# Patient Record
Sex: MALE | Race: WHITE | NOT HISPANIC OR LATINO | Employment: OTHER
[De-identification: names, ages, dates, MRNs, and addresses within clinical notes are randomized per-mention and may not be internally consistent; named-entity substitution may affect disease eponyms.]

---

## 2018-05-24 ENCOUNTER — CHARTING TRANS (OUTPATIENT)
Dept: OTHER | Age: 58
End: 2018-05-24

## 2019-01-08 ENCOUNTER — TELEPHONE (OUTPATIENT)
Dept: FAMILY MEDICINE | Age: 59
End: 2019-01-08

## 2019-01-08 RX ORDER — LANSOPRAZOLE 30 MG/1
30 CAPSULE, DELAYED RELEASE ORAL DAILY
Qty: 90 CAPSULE | Refills: 1 | Status: SHIPPED | OUTPATIENT
Start: 2019-01-08

## 2019-01-08 RX ORDER — LANSOPRAZOLE 30 MG/1
1 CAPSULE, DELAYED RELEASE ORAL DAILY
COMMUNITY
End: 2019-01-08 | Stop reason: SDUPTHER

## 2019-01-29 ENCOUNTER — TELEPHONE (OUTPATIENT)
Dept: FAMILY MEDICINE | Age: 59
End: 2019-01-29

## 2019-01-29 RX ORDER — TADALAFIL 5 MG/1
5 TABLET ORAL DAILY PRN
COMMUNITY
End: 2019-01-29 | Stop reason: SDUPTHER

## 2019-01-29 RX ORDER — TADALAFIL 5 MG/1
5 TABLET ORAL DAILY PRN
Qty: 30 TABLET | Refills: 0 | Status: SHIPPED | OUTPATIENT
Start: 2019-01-29 | End: 2019-04-02 | Stop reason: SDUPTHER

## 2019-04-02 RX ORDER — TADALAFIL 5 MG/1
TABLET ORAL
Qty: 30 TABLET | Refills: 0 | Status: SHIPPED | OUTPATIENT
Start: 2019-04-02

## 2020-07-21 PROBLEM — E11.29 TYPE 2 DIABETES MELLITUS WITH KIDNEY COMPLICATION, WITHOUT LONG-TERM CURRENT USE OF INSULIN  (CMD): Status: ACTIVE | Noted: 2020-07-21

## 2020-07-21 PROBLEM — I44.2 CHB (COMPLETE HEART BLOCK) (CMD): Chronic | Status: ACTIVE | Noted: 2020-07-21

## 2020-07-21 PROBLEM — E11.49 TYPE 2 DIABETES MELLITUS WITH NEUROLOGIC COMPLICATION, WITHOUT LONG-TERM CURRENT USE OF INSULIN  (CMD): Status: ACTIVE | Noted: 2020-07-21

## 2020-07-21 PROBLEM — E78.49 OTHER HYPERLIPIDEMIA: Status: ACTIVE | Noted: 2020-07-21

## 2020-07-21 PROBLEM — E11.9 TYPE 2 DIABETES MELLITUS WITHOUT COMPLICATION, WITHOUT LONG-TERM CURRENT USE OF INSULIN  (CMD): Status: ACTIVE | Noted: 2020-07-21

## 2020-07-21 PROBLEM — B35.1 ONYCHOMYCOSIS OF TOENAIL: Status: ACTIVE | Noted: 2020-07-21

## 2020-07-21 PROBLEM — I48.0 PAROXYSMAL ATRIAL FIBRILLATION (CMD): Status: ACTIVE | Noted: 2020-07-21

## 2020-07-21 PROBLEM — E11.9 TYPE 2 DIABETES MELLITUS WITHOUT COMPLICATION, WITHOUT LONG-TERM CURRENT USE OF INSULIN (CMD): Chronic | Status: ACTIVE | Noted: 2020-07-21

## 2020-07-21 PROBLEM — I48.0 PAROXYSMAL ATRIAL FIBRILLATION  (CMD): Chronic | Status: ACTIVE | Noted: 2020-07-21

## 2021-12-16 ENCOUNTER — EXTERNAL RECORD (OUTPATIENT)
Dept: HEALTH INFORMATION MANAGEMENT | Facility: OTHER | Age: 61
End: 2021-12-16

## 2023-09-06 ENCOUNTER — HOSPITAL ENCOUNTER (OUTPATIENT)
Dept: CARDIAC REHAB | Age: 63
Discharge: STILL ADMITTED OR ADMINISTRATIVE | End: 2023-09-06

## 2023-09-06 VITALS — HEIGHT: 69 IN | BODY MASS INDEX: 24.81 KG/M2

## 2023-09-06 PROCEDURE — 93798 PHYS/QHP OP CAR RHAB W/ECG: CPT

## 2023-09-06 ASSESSMENT — PATIENT HEALTH QUESTIONNAIRE - PHQ9
7. TROUBLE CONCENTRATING ON THINGS, SUCH AS READING THE NEWSPAPER OR WATCHING TELEVISION: NOT AT ALL
SUM OF ALL RESPONSES TO PHQ QUESTIONS 1-9: 4
5. POOR APPETITE OR OVEREATING: NOT AT ALL
6. FEELING BAD ABOUT YOURSELF - OR THAT YOU ARE A FAILURE OR HAVE LET YOURSELF OR YOUR FAMILY DOWN: NOT AT ALL
10. IF YOU CHECKED OFF ANY PROBLEMS, HOW DIFFICULT HAVE THESE PROBLEMS MADE IT FOR YOU TO DO YOUR WORK, TAKE CARE OF THINGS AT HOME, OR GET ALONG WITH OTHER PEOPLE: NOT DIFFICULT AT ALL
3. TROUBLE FALLING OR STAYING ASLEEP OR SLEEPING TOO MUCH: NOT AT ALL
4. FEELING TIRED OR HAVING LITTLE ENERGY: MORE THAN HALF THE DAYS
1. LITTLE INTEREST OR PLEASURE IN DOING THINGS: NOT AT ALL
8. MOVING OR SPEAKING SO SLOWLY THAT OTHER PEOPLE COULD HAVE NOTICED. OR THE OPPOSITE, BEING SO FIGETY OR RESTLESS THAT YOU HAVE BEEN MOVING AROUND A LOT MORE THAN USUAL: MORE THAN HALF THE DAYS
9. THOUGHTS THAT YOU WOULD BE BETTER OFF DEAD, OR OF HURTING YOURSELF: NOT AT ALL
2. FEELING DOWN, DEPRESSED OR HOPELESS: NOT AT ALL

## 2023-09-06 ASSESSMENT — EJECTION FRACTION: LVEF_VALUE: 50

## 2023-09-06 ASSESSMENT — LIFESTYLE VARIABLES: ALCOHOL_INTAKE: 0

## 2023-09-07 ENCOUNTER — HOSPITAL ENCOUNTER (OUTPATIENT)
Dept: CARDIAC REHAB | Age: 63
Discharge: STILL ADMITTED OR ADMINISTRATIVE | End: 2023-09-07

## 2023-09-07 PROCEDURE — 93798 PHYS/QHP OP CAR RHAB W/ECG: CPT

## 2023-09-11 ENCOUNTER — HOSPITAL ENCOUNTER (OUTPATIENT)
Dept: CARDIAC REHAB | Age: 63
Discharge: STILL ADMITTED OR ADMINISTRATIVE | End: 2023-09-11

## 2023-09-11 PROCEDURE — 93798 PHYS/QHP OP CAR RHAB W/ECG: CPT

## 2023-09-13 ENCOUNTER — HOSPITAL ENCOUNTER (OUTPATIENT)
Dept: CARDIAC REHAB | Age: 63
Discharge: STILL ADMITTED OR ADMINISTRATIVE | End: 2023-09-13

## 2023-09-13 PROCEDURE — 93798 PHYS/QHP OP CAR RHAB W/ECG: CPT

## 2023-09-14 ENCOUNTER — HOSPITAL ENCOUNTER (OUTPATIENT)
Dept: CARDIAC REHAB | Age: 63
Discharge: STILL ADMITTED OR ADMINISTRATIVE | End: 2023-09-14

## 2023-09-14 PROCEDURE — 93798 PHYS/QHP OP CAR RHAB W/ECG: CPT

## 2023-09-18 ENCOUNTER — HOSPITAL ENCOUNTER (OUTPATIENT)
Dept: CARDIAC REHAB | Age: 63
Discharge: STILL ADMITTED OR ADMINISTRATIVE | End: 2023-09-18

## 2023-09-18 PROCEDURE — 93798 PHYS/QHP OP CAR RHAB W/ECG: CPT

## 2023-09-18 ASSESSMENT — EJECTION FRACTION: LVEF_VALUE: 50

## 2023-09-18 ASSESSMENT — LIFESTYLE VARIABLES: ALCOHOL_INTAKE: 0

## 2023-09-20 ENCOUNTER — HOSPITAL ENCOUNTER (OUTPATIENT)
Dept: CARDIAC REHAB | Age: 63
Discharge: STILL ADMITTED OR ADMINISTRATIVE | End: 2023-09-20

## 2023-09-20 PROCEDURE — 93798 PHYS/QHP OP CAR RHAB W/ECG: CPT

## 2023-09-21 ENCOUNTER — HOSPITAL ENCOUNTER (OUTPATIENT)
Dept: CARDIAC REHAB | Age: 63
Discharge: STILL ADMITTED OR ADMINISTRATIVE | End: 2023-09-21

## 2023-09-21 PROCEDURE — 93798 PHYS/QHP OP CAR RHAB W/ECG: CPT

## 2023-09-26 ENCOUNTER — HOSPITAL ENCOUNTER (OUTPATIENT)
Dept: CARDIAC REHAB | Age: 63
Discharge: STILL ADMITTED OR ADMINISTRATIVE | End: 2023-09-26

## 2023-09-26 PROCEDURE — 93798 PHYS/QHP OP CAR RHAB W/ECG: CPT

## 2023-09-27 ENCOUNTER — HOSPITAL ENCOUNTER (OUTPATIENT)
Dept: CARDIAC REHAB | Age: 63
Discharge: STILL ADMITTED OR ADMINISTRATIVE | End: 2023-09-27

## 2023-09-27 PROCEDURE — 93798 PHYS/QHP OP CAR RHAB W/ECG: CPT

## 2023-10-03 ENCOUNTER — HOSPITAL ENCOUNTER (OUTPATIENT)
Dept: CARDIAC REHAB | Age: 63
Discharge: STILL ADMITTED OR ADMINISTRATIVE | End: 2023-10-03

## 2023-10-03 PROCEDURE — 93798 PHYS/QHP OP CAR RHAB W/ECG: CPT

## 2023-10-09 ENCOUNTER — APPOINTMENT (OUTPATIENT)
Dept: CARDIAC REHAB | Age: 63
End: 2023-10-09

## 2023-10-10 ENCOUNTER — HOSPITAL ENCOUNTER (OUTPATIENT)
Dept: CARDIAC REHAB | Age: 63
Discharge: STILL ADMITTED OR ADMINISTRATIVE | End: 2023-10-10

## 2023-10-10 PROCEDURE — 93798 PHYS/QHP OP CAR RHAB W/ECG: CPT

## 2023-10-11 ENCOUNTER — HOSPITAL ENCOUNTER (OUTPATIENT)
Dept: CARDIAC REHAB | Age: 63
Discharge: STILL ADMITTED OR ADMINISTRATIVE | End: 2023-10-11

## 2023-10-11 ENCOUNTER — APPOINTMENT (OUTPATIENT)
Dept: CARDIAC REHAB | Age: 63
End: 2023-10-11

## 2023-10-11 PROCEDURE — 93798 PHYS/QHP OP CAR RHAB W/ECG: CPT

## 2023-10-12 ENCOUNTER — APPOINTMENT (OUTPATIENT)
Dept: CARDIAC REHAB | Age: 63
End: 2023-10-12

## 2023-10-12 ASSESSMENT — EJECTION FRACTION: LVEF_VALUE: 50

## 2023-10-12 ASSESSMENT — LIFESTYLE VARIABLES: ALCOHOL_INTAKE: 0

## 2023-10-16 ENCOUNTER — HOSPITAL ENCOUNTER (OUTPATIENT)
Dept: CARDIAC REHAB | Age: 63
Discharge: HOME | End: 2023-10-16

## 2023-10-18 ENCOUNTER — HOSPITAL ENCOUNTER (OUTPATIENT)
Dept: CARDIAC REHAB | Age: 63
Discharge: HOME | End: 2023-10-18

## 2023-10-19 ENCOUNTER — APPOINTMENT (OUTPATIENT)
Dept: CARDIAC REHAB | Age: 63
End: 2023-10-19

## 2023-10-23 ENCOUNTER — APPOINTMENT (OUTPATIENT)
Dept: CARDIAC REHAB | Age: 63
End: 2023-10-23

## 2023-10-25 ENCOUNTER — APPOINTMENT (OUTPATIENT)
Dept: CARDIAC REHAB | Age: 63
End: 2023-10-25

## 2023-10-26 ENCOUNTER — HOSPITAL ENCOUNTER (OUTPATIENT)
Dept: CARDIAC REHAB | Age: 63
Discharge: STILL ADMITTED OR ADMINISTRATIVE | End: 2023-10-26

## 2023-10-26 PROCEDURE — 93798 PHYS/QHP OP CAR RHAB W/ECG: CPT

## 2023-11-01 ENCOUNTER — APPOINTMENT (OUTPATIENT)
Dept: CARDIAC REHAB | Age: 63
End: 2023-11-01

## 2023-11-02 ENCOUNTER — APPOINTMENT (OUTPATIENT)
Dept: CARDIAC REHAB | Age: 63
End: 2023-11-02

## 2023-11-02 ASSESSMENT — LIFESTYLE VARIABLES: ALCOHOL_INTAKE: 0

## 2023-11-02 ASSESSMENT — EJECTION FRACTION: LVEF_VALUE: 50

## 2023-11-06 ENCOUNTER — HOSPITAL ENCOUNTER (OUTPATIENT)
Dept: CARDIAC REHAB | Age: 63
Discharge: STILL ADMITTED OR ADMINISTRATIVE | End: 2023-11-06

## 2023-11-06 PROCEDURE — 93798 PHYS/QHP OP CAR RHAB W/ECG: CPT

## 2023-11-08 ENCOUNTER — HOSPITAL ENCOUNTER (OUTPATIENT)
Dept: CARDIAC REHAB | Age: 63
Discharge: STILL ADMITTED OR ADMINISTRATIVE | End: 2023-11-08

## 2023-11-08 PROCEDURE — 93798 PHYS/QHP OP CAR RHAB W/ECG: CPT

## 2023-11-09 ENCOUNTER — APPOINTMENT (OUTPATIENT)
Dept: CARDIAC REHAB | Age: 63
End: 2023-11-09

## 2023-11-15 ENCOUNTER — HOSPITAL ENCOUNTER (OUTPATIENT)
Dept: CARDIAC REHAB | Age: 63
Discharge: STILL ADMITTED OR ADMINISTRATIVE | End: 2023-11-15

## 2023-11-15 VITALS — BODY MASS INDEX: 24.81 KG/M2 | HEIGHT: 69 IN

## 2023-11-15 PROCEDURE — 93798 PHYS/QHP OP CAR RHAB W/ECG: CPT

## 2023-11-15 ASSESSMENT — EJECTION FRACTION: LVEF_VALUE: 50

## 2023-11-15 ASSESSMENT — PATIENT HEALTH QUESTIONNAIRE - PHQ9
2. FEELING DOWN, DEPRESSED OR HOPELESS: NOT AT ALL
7. TROUBLE CONCENTRATING ON THINGS, SUCH AS READING THE NEWSPAPER OR WATCHING TELEVISION: NOT AT ALL
3. TROUBLE FALLING OR STAYING ASLEEP OR SLEEPING TOO MUCH: NOT AT ALL
8. MOVING OR SPEAKING SO SLOWLY THAT OTHER PEOPLE COULD HAVE NOTICED. OR THE OPPOSITE, BEING SO FIGETY OR RESTLESS THAT YOU HAVE BEEN MOVING AROUND A LOT MORE THAN USUAL: NOT AT ALL
1. LITTLE INTEREST OR PLEASURE IN DOING THINGS: NOT AT ALL
9. THOUGHTS THAT YOU WOULD BE BETTER OFF DEAD, OR OF HURTING YOURSELF: NOT AT ALL
4. FEELING TIRED OR HAVING LITTLE ENERGY: NOT AT ALL
6. FEELING BAD ABOUT YOURSELF - OR THAT YOU ARE A FAILURE OR HAVE LET YOURSELF OR YOUR FAMILY DOWN: NOT AT ALL
10. IF YOU CHECKED OFF ANY PROBLEMS, HOW DIFFICULT HAVE THESE PROBLEMS MADE IT FOR YOU TO DO YOUR WORK, TAKE CARE OF THINGS AT HOME, OR GET ALONG WITH OTHER PEOPLE: NOT DIFFICULT AT ALL
SUM OF ALL RESPONSES TO PHQ QUESTIONS 1-9: 0
5. POOR APPETITE OR OVEREATING: NOT AT ALL

## 2023-11-15 ASSESSMENT — LIFESTYLE VARIABLES: ALCOHOL_INTAKE: 0

## 2023-11-16 ENCOUNTER — APPOINTMENT (OUTPATIENT)
Dept: CARDIAC REHAB | Age: 63
End: 2023-11-16

## 2023-11-20 ENCOUNTER — APPOINTMENT (OUTPATIENT)
Dept: CARDIAC REHAB | Age: 63
End: 2023-11-20

## 2023-11-22 ENCOUNTER — APPOINTMENT (OUTPATIENT)
Dept: CARDIAC REHAB | Age: 63
End: 2023-11-22

## 2023-11-27 ENCOUNTER — APPOINTMENT (OUTPATIENT)
Dept: CARDIAC REHAB | Age: 63
End: 2023-11-27

## 2023-11-29 ENCOUNTER — APPOINTMENT (OUTPATIENT)
Dept: CARDIAC REHAB | Age: 63
End: 2023-11-29

## 2023-11-30 ENCOUNTER — APPOINTMENT (OUTPATIENT)
Dept: CARDIAC REHAB | Age: 63
End: 2023-11-30

## 2023-12-04 ENCOUNTER — APPOINTMENT (OUTPATIENT)
Dept: CARDIAC REHAB | Age: 63
End: 2023-12-04

## 2023-12-06 ENCOUNTER — APPOINTMENT (OUTPATIENT)
Dept: CARDIAC REHAB | Age: 63
End: 2023-12-06

## 2024-01-20 ENCOUNTER — HOSPITAL ENCOUNTER (EMERGENCY)
Facility: HOSPITAL | Age: 64
Discharge: HOME | End: 2024-01-20
Attending: EMERGENCY MEDICINE
Payer: MEDICARE

## 2024-01-20 ENCOUNTER — APPOINTMENT (OUTPATIENT)
Dept: CT IMAGING | Facility: HOSPITAL | Age: 64
End: 2024-01-20
Attending: EMERGENCY MEDICINE
Payer: MEDICARE

## 2024-01-20 VITALS
SYSTOLIC BLOOD PRESSURE: 115 MMHG | DIASTOLIC BLOOD PRESSURE: 69 MMHG | TEMPERATURE: 98 F | RESPIRATION RATE: 29 BRPM | HEART RATE: 101 BPM | OXYGEN SATURATION: 100 %

## 2024-01-20 DIAGNOSIS — S01.81XA FOREHEAD LACERATION, INITIAL ENCOUNTER: Primary | ICD-10-CM

## 2024-01-20 DIAGNOSIS — S06.0X0A CONCUSSION WITHOUT LOSS OF CONSCIOUSNESS, INITIAL ENCOUNTER: ICD-10-CM

## 2024-01-20 DIAGNOSIS — T14.8XXA ABRASION: ICD-10-CM
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
## 2024-01-20 DIAGNOSIS — S09.90XA INJURY OF HEAD, INITIAL ENCOUNTER: ICD-10-CM

## 2024-01-20 LAB
ALBUMIN SERPL-MCNC: 3.8 G/DL (ref 3.4–5)
ALBUMIN/GLOB SERPL: 1.4 {RATIO} (ref 1–2)
ALP LIVER SERPL-CCNC: 41 U/L
ANION GAP SERPL CALC-SCNC: 7 MMOL/L (ref 0–18)
ANTIBODY SCREEN: NEGATIVE
APTT PPP: 24.5 SECONDS (ref 23.3–35.6)
AST SERPL-CCNC: 29 U/L (ref 15–37)
BASOPHILS # BLD AUTO: 0.03 X10(3) UL (ref 0–0.2)
BASOPHILS NFR BLD AUTO: 0.3 %
BILIRUB SERPL-MCNC: 0.6 MG/DL (ref 0.1–2)
BUN BLD-MCNC: 18 MG/DL (ref 9–23)
CALCIUM BLD-MCNC: 8.9 MG/DL (ref 8.5–10.1)
CHLORIDE SERPL-SCNC: 110 MMOL/L (ref 98–112)
CO2 SERPL-SCNC: 20 MMOL/L (ref 21–32)
CREAT BLD-MCNC: 1.69 MG/DL
EGFRCR SERPLBLD CKD-EPI 2021: 44 ML/MIN/1.73M2 (ref 60–?)
EOSINOPHIL # BLD AUTO: 0.07 X10(3) UL (ref 0–0.7)
EOSINOPHIL NFR BLD AUTO: 0.7 %
ERYTHROCYTE [DISTWIDTH] IN BLOOD BY AUTOMATED COUNT: 15.5 %
GLOBULIN PLAS-MCNC: 2.7 G/DL (ref 2.8–4.4)
GLUCOSE BLD-MCNC: 94 MG/DL (ref 70–99)
HCT VFR BLD AUTO: 29.4 %
HGB BLD-MCNC: 8.9 G/DL
IMM GRANULOCYTES # BLD AUTO: 0.08 X10(3) UL (ref 0–1)
IMM GRANULOCYTES NFR BLD: 0.8 %
INR BLD: 1.04 (ref 0.8–1.2)
LYMPHOCYTES # BLD AUTO: 1.29 X10(3) UL (ref 1–4)
LYMPHOCYTES NFR BLD AUTO: 12.7 %
MCH RBC QN AUTO: 25.6 PG (ref 26–34)
MCHC RBC AUTO-ENTMCNC: 30.3 G/DL (ref 31–37)
MCV RBC AUTO: 84.7 FL
MONOCYTES # BLD AUTO: 0.86 X10(3) UL (ref 0.1–1)
MONOCYTES NFR BLD AUTO: 8.5 %
NEUTROPHILS # BLD AUTO: 7.8 X10 (3) UL (ref 1.5–7.7)
NEUTROPHILS # BLD AUTO: 7.8 X10(3) UL (ref 1.5–7.7)
NEUTROPHILS NFR BLD AUTO: 77 %
OSMOLALITY SERPL CALC.SUM OF ELEC: 286 MOSM/KG (ref 275–295)
PLATELET # BLD AUTO: 236 10(3)UL (ref 150–450)
POTASSIUM SERPL-SCNC: 4.9 MMOL/L (ref 3.5–5.1)
PROT SERPL-MCNC: 6.5 G/DL (ref 6.4–8.2)
PROTHROMBIN TIME: 13.6 SECONDS (ref 11.6–14.8)
RBC # BLD AUTO: 3.47 X10(6)UL
RH BLOOD TYPE: POSITIVE
SODIUM SERPL-SCNC: 137 MMOL/L (ref 136–145)
WBC # BLD AUTO: 10.1 X10(3) UL (ref 4–11)

## 2024-01-20 PROCEDURE — 86850 RBC ANTIBODY SCREEN: CPT | Performed by: EMERGENCY MEDICINE

## 2024-01-20 PROCEDURE — 76376 3D RENDER W/INTRP POSTPROCES: CPT | Performed by: EMERGENCY MEDICINE

## 2024-01-20 PROCEDURE — 36415 COLL VENOUS BLD VENIPUNCTURE: CPT

## 2024-01-20 PROCEDURE — 12011 RPR F/E/E/N/L/M 2.5 CM/<: CPT

## 2024-01-20 PROCEDURE — 85730 THROMBOPLASTIN TIME PARTIAL: CPT | Performed by: EMERGENCY MEDICINE

## 2024-01-20 PROCEDURE — 86900 BLOOD TYPING SEROLOGIC ABO: CPT | Performed by: EMERGENCY MEDICINE

## 2024-01-20 PROCEDURE — 72125 CT NECK SPINE W/O DYE: CPT | Performed by: EMERGENCY MEDICINE

## 2024-01-20 PROCEDURE — 85025 COMPLETE CBC W/AUTO DIFF WBC: CPT | Performed by: EMERGENCY MEDICINE

## 2024-01-20 PROCEDURE — 90471 IMMUNIZATION ADMIN: CPT

## 2024-01-20 PROCEDURE — 86901 BLOOD TYPING SEROLOGIC RH(D): CPT | Performed by: EMERGENCY MEDICINE

## 2024-01-20 PROCEDURE — 70480 CT ORBIT/EAR/FOSSA W/O DYE: CPT | Performed by: EMERGENCY MEDICINE

## 2024-01-20 PROCEDURE — 99284 EMERGENCY DEPT VISIT MOD MDM: CPT

## 2024-01-20 PROCEDURE — 99285 EMERGENCY DEPT VISIT HI MDM: CPT

## 2024-01-20 PROCEDURE — 85610 PROTHROMBIN TIME: CPT | Performed by: EMERGENCY MEDICINE

## 2024-01-20 PROCEDURE — 80053 COMPREHEN METABOLIC PANEL: CPT | Performed by: EMERGENCY MEDICINE

## 2024-01-20 PROCEDURE — 70450 CT HEAD/BRAIN W/O DYE: CPT | Performed by: EMERGENCY MEDICINE

## 2024-01-20 RX ORDER — ACETAMINOPHEN 500 MG
1000 TABLET ORAL ONCE
Status: COMPLETED | OUTPATIENT
Start: 2024-01-20 | End: 2024-01-20

## 2024-01-20 RX ORDER — LIDOCAINE HYDROCHLORIDE AND EPINEPHRINE 10; 10 MG/ML; UG/ML
INJECTION, SOLUTION INFILTRATION; PERINEURAL
Status: COMPLETED
Start: 2024-01-20 | End: 2024-01-20

## 2024-01-20 NOTE — ED PROVIDER NOTES
Patient Seen in: [facility] Emergency Department      History     Chief Complaint   Patient presents with    Trauma 1 & 2    Fall     Stated Complaint: Trauma, Fall    Subjective:   HPI    This is a 64-year-old male past medical history of heart transplant June 2023, diabetes, high cholesterol, hypertension who was working at [employer] when he tripped.  He missed a step and fell and hit his head on concrete.  The fall was unwitnessed.  Unknown LOC.  He has a laceration above his left eye and some abrasions on his hands.  Per paramedics he has been confused and asking the same questions over and over.  He does take an aspirin daily.  He denies any other anticoagulants.  He can tell me that the year is 2024.  He cannot state the president or the month.  He knows that his wintertime.  He presents here for further evaluation.    Attempted review medical records there is no information yet posted for care everywhere.    Objective:   No pertinent past medical history.            No pertinent past surgical history.              No pertinent social history.            Review of Systems    Positive for stated complaint: Trauma, Fall  Other systems are as noted in HPI.  Constitutional and vital signs reviewed.      All other systems reviewed and negative except as noted above.    Physical Exam     ED Triage Vitals   BP 01/20/24 1000 146/81   Pulse 01/20/24 0945 101   Resp 01/20/24 0945 20   Temp 01/20/24 0952 97.8 °F (36.6 °C)   Temp src 01/20/24 0952 Temporal   SpO2 01/20/24 0945 100 %   O2 Device 01/20/24 0945 None (Room air)       Current:/69   Pulse 101   Temp 97.8 °F (36.6 °C) (Temporal)   Resp (!) 29   SpO2 100%         Physical Exam    GCS 14    GENERAL: Awake, alert oriented x3, nontoxic appearing.   SKIN: Normal, warm, and dry.  HEENT: 2 cm horizontal Zulli oriented superficial laceration above his left eyebrow.'s ecchymosis and swelling over left orbit.  No epistaxis.  No broken teeth.  No  tongue laceration.  Pupils equally round and reactive to light. Conjuctiva clear.  Oropharynx is clear and moist.  No tenderness to cervical spine.  Lungs: Clear to auscultation bilaterally with no rales, no retractions, and no wheezing.  HEART:  Regular rate and rhythm. S1 and S2. No murmurs, no rubs or gallops.   ABDOMEN: Soft, nontender and nondistended. Normoactive bowel sounds. No rebound. No guarding.   Back: No tenderness along thoracic or lumbar spine.  No trauma noted to the back.  No bruising.  No crepitus.    EXTREMITIES: Minor abrasion left forearm dorsal aspect.  Minor abrasions to the ring finger knuckles bilaterally dorsal aspect.   warm with brisk capillary refill.         ED Course     Labs Reviewed   COMP METABOLIC PANEL (14) - Abnormal; Notable for the following components:       Result Value    CO2 20.0 (*)     Creatinine 1.69 (*)     eGFR-Cr 44 (*)     Alkaline Phosphatase 41 (*)     Globulin  2.7 (*)     All other components within normal limits   CBC W/ DIFFERENTIAL - Abnormal; Notable for the following components:    RBC 3.47 (*)     HGB 8.9 (*)     HCT 29.4 (*)     MCH 25.6 (*)     MCHC 30.3 (*)     Neutrophil Absolute Prelim 7.80 (*)     Neutrophil Absolute 7.80 (*)     All other components within normal limits   PROTHROMBIN TIME (PT) - Normal   PTT, ACTIVATED - Normal   CBC WITH DIFFERENTIAL WITH PLATELET    Narrative:     The following orders were created for panel order CBC With Differential With Platelet.  Procedure                               Abnormality         Status                     ---------                               -----------         ------                     CBC W/ DIFFERENTIAL[[id number]]          Abnormal            Final result                 Please view results for these tests on the individual orders.   TYPE AND SCREEN    Narrative:     The following orders were created for panel order Type and screen.  Procedure                               Abnormality          Status                     ---------                               -----------         ------                     ABORH (Blood Type)[[id number]]                               Final result               Antibody Screen[[id number]]                                  Final result                 Please view results for these tests on the individual orders.   ABORH (BLOOD TYPE)   ANTIBODY SCREEN        CT SPINE CERVICAL (CPT=72125)    Result Date: 1/20/2024  PROCEDURE:  CT SPINE CERVICAL (CPT=72125)  COMPARISON:  None.  INDICATIONS:  Trauma, Fall  TECHNIQUE:  Noncontrast CT scanning of the cervical spine is performed from the skull base through C7.  Multiplanar reconstructions are generated.  Dose reduction techniques were used. Dose information is transmitted to the ACR (American College of Radiology) NRDR (National Radiology Data Registry) which includes the Dose Index Registry.  PATIENT STATED HISTORY: (As transcribed by Technologist)  Fall    FINDINGS:  CRANIOCERVICAL AREA:  Atlantoaxial arthritic changes are seen with spurring and some subchondral sclerosis.  No Chiari malformation identified. PARASPINAL AREA:  Prevertebral soft tissues do not appear thickened.  No paraspinal hematoma is identified. BONES:  There is no acute fracture, subluxation or dislocation.  Degenerative disc disease noted with disc space narrowing and endplate spurring from C3-4 through C7-T1.  The degenerative disc disease is most prominent at C4-5 and C6-7.  There appear to be small disc osteophyte complexes at C4-5 and C6-7.  Multilevel facet arthropathy noted especially at C4-5 through C6-7.  CERVICAL DISC LEVELS: C2-C3:  No central spinal canal stenosis or significant foraminal stenosis. C3-C4:  Mild bilateral foraminal stenosis.  No central spinal canal stenosis is seen.  C4-C5:  Severe left foraminal stenosis noted secondary to uncinate hypertrophy and marked facet arthropathy.  Mild right foraminal stenosis noted.  The left lateral recess  is narrowed by uncinate hypertrophy. C5-C6:  Severe right foraminal stenosis noted secondary to uncinate hypertrophy and facet arthropathy.  No significant left foraminal stenosis.  No bony central spinal canal stenosis. C6-C7:  Marked bilateral foraminal narrowing secondary to uncinate hypertrophy.  No bony central spinal canal stenosis. C7-T1:  No central spinal canal stenosis or foraminal stenosis.            CONCLUSION:  1. No acute fracture, subluxation or dislocation. 2. Degenerative and arthritic changes as described above. 3. Multilevel foraminal stenoses are seen as detailed above.     LOCATION:  [facility]   Dictated by (CST): Jon Mendez MD on 1/20/2024 at 10:15 AM     Finalized by (CST): Jon Mendez MD on 1/20/2024 at 10:23 AM       CT ORBITS (CPT=70480)    Result Date: 1/20/2024  PROCEDURE:  CT ORBITS (CPT=70480)  COMPARISON:  [facility] , CT, CT BRAIN OR HEAD (11161), 1/20/2024, 9:49 AM.  INDICATIONS:  Trauma, Fall  TECHNIQUE:  Multi-planar CT images were performed through the orbits without intravenous contrast.  3D shaded surface renderings and MPR's are generated on an independent CT scanner workstation.  Dose reduction techniques were used. Dose information is transmitted to the ACR (American College of Radiology) NRDR (National Radiology Data Registry) which includes the Dose Index Registry.  3-D RENDERING:  Additional 3-D rendering was generated by the technologist.  PATIENT STATED HISTORY:(As transcribed by Technologist)  Fall    FINDINGS:  GLOBES:  No asymmetry or no visible mass.  EXTRAOCULAR MUSCLES:  No enlargement or asymmetry.  INTRACONAL SPACE:  No visible mass, with normal retrobulbar fat.  EXTRACONAL SPACE:  No visible mass.  SINUSES:  Mild mucosal thickening in the maxillary sinuses. BONES:  Intact bony orbit, without evidence of fracture.  OTHER:  There is left periorbital soft tissue swelling.  There is also soft tissue swelling seen focally at the left cheek.  No focal  hematoma.             CONCLUSION:  1. Negative for acute fracture. 2. Left preseptal periorbital soft tissue swelling. 3. Left cheek contusion. 4. Details as above.  Continued clinical correlation recommended.    LOCATION:  [facility]   Dictated by (CST): Lemuel Blanco MD on 1/20/2024 at 10:18 AM     Finalized by (CST): Lemuel Blanco MD on 1/20/2024 at 10:23 AM       CT BRAIN OR HEAD (26302)    Result Date: 1/20/2024  PROCEDURE:  CT BRAIN OR HEAD (17877)  COMPARISON:  None.  INDICATIONS:  Trauma, Fall.  Confusion after hitting head.  Laceration above left eye.  TECHNIQUE:  Noncontrast CT scanning is performed through the brain. Dose reduction techniques were used. Dose information is transmitted to the ACR (American College of Radiology) NRDR (National Radiology Data Registry) which includes the Dose Index Registry.  PATIENT STATED HISTORY: (As transcribed by Technologist)  Fall    FINDINGS:  VENTRICLES/SULCI:  Mild sulcal prominence.  Ventricles are not dilated. INTRACRANIAL:  Mild decreased attenuation seen in the periventricular and subcortical deep white matter which is nonspecific and could reflect chronic microvascular ischemic disease.  There is no acute intracranial hemorrhage, mass effect or midline shift.  There is nothing specific for an acute territorial infarct.  No abnormal intra or extra-axial fluid collections are identified.  There is no subdural hematoma. SINUSES:           Chronic sinusitis changes are seen bilaterally in the maxillary sinuses with mucosal thickening.  There is no air-fluid level.  The rest of the sinuses appear well aerated. MASTOIDS:          No sign of acute inflammation. SKULL:             No evidence for fracture or osseous abnormality. OTHER:             There is subcutaneous edema and hematoma seen within the left malar region and in the left periorbital and supraorbital scalp.  No retrobulbar hemorrhage is seen.  The lenses are normally located.            CONCLUSION:  1. No acute  intracranial hemorrhage, mass effect or midline shift. 2. Chronic microvascular ischemic changes and mild atrophy. 3. Left malar facial subcutaneous hematoma, edema and left periorbital and supraorbital hematoma.  No retrobulbar hemorrhage seen. 4. No skull fracture. 5. Mild chronic sinusitis.     LOCATION:  [facility]   Dictated by (CST): Jon Mendez MD on 1/20/2024 at 10:06 AM     Finalized by (CST): Jon Mendez MD on 1/20/2024 at 10:11 AM                   MDM            This is a 64-year-old male past medical history of heart transplant June 2023 who was working at [employer] when he tripped.  He missed a step and fell and hit his head on concrete.  Differential includes acute intracranial bleed which is a life threat, cervical spine fracture, orbital fractures.      Patient placed on cardiac monitor, continuous pulse oximetry and IV line was established of normal saline.  Patient in c-collar remained on.  Basic labs were obtained.  CBC: White blood cell count 10.1.  Hemoglobin 8.9.  Previous hemoglobin 9.5 and 12/14/2023, hemoglobin 9.0 on 11/14/2023 this appears to be his baseline.  Platelet 236.      Tetanus immunization was updated.  Wounds were cleansed.  Bacitracin/Band-Aid applied to wound.    CT scan brain: No acute intracranial hemorrhage or mass effect.  Chronic microvascular ischemic changes noted.  Left facial subcutaneous hematoma/edema.  Left periorbital hematoma.  No retrobulbar hematoma seen.  No skull fracture.    CT scan cervical spine: Negative for acute fracture.  Degenerative changes noted    CT scan orbit negative for acute fracture.  Left preseptal periorbital tissue swelling.      Laceration was anesthetized with lidocaine locally.  The wound was cleansed and irrigated copiously.  There was no visible foreign body within the wound. The wound was closed using a simple closure with 5-0 nylon, 5 sutures were placed.  The quality of the closure was excellent dressing  applied topically.        11:04 AM reassessed patient.  He is completely alert and oriented x 3.  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  He states he was going on stairs with equipment and he missed the last step which is why he fell.  He has no new complaints of pain.  He was ambulated.    He states he lives alone but has a girlfriend that lives across the street.  I recommend he stay with her for the next 24 hours.  We discussed that if he has any new or worsening symptoms such as headache, vomiting or he does not feel balanced he needs to go to the closest ER for repeat head scan.  He expresses understanding with plan of care.      12:33 PM patient was ambulated.  He had no recurrence of symptoms.  No new complaints of pain.  He lives about an hour away.  He should follow-up with his primary care physician for suture removal in 5 days or go to an urgent care.  He can continue Tylenol for pain.  Recommend icing over left orbit for swelling 20 minutes every 2 hours while awake for the next 24 hours.  Return for any problems.  Patient discharged home in good condition.      Disposition and Plan     Clinical Impression:  1. Forehead laceration, initial encounter    2. Fall, initial encounter    3. Injury of head, initial encounter    4. Abrasion    5. Concussion without loss of consciousness, initial encounter         Disposition:  Discharge  1/20/2024 12:33 pm    Follow-up:  your primary care doctor    Follow up            Medications Prescribed:  There are no discharge medications for this patient.

## 2024-01-20 NOTE — ED QUICK NOTES
PT SITTING UP IN BED HAVING LUNCH: SOUP, SALAD, MEATLOAF, MASHED POTATOES, GRAVY, GREEN BEANS, & JELLO FOR DESERT.

## 2024-01-20 NOTE — DISCHARGE INSTRUCTIONS
Stay with somebody for the next 24 hours  Tylenol for pain  Cool compress to left forehead 20 minutes every 2 hours while awake for the next 24 hours  Follow head injury instructions, go to closest emergency room if you develop nausea vomiting, headache or loss of balance or confusion  Suture removal 5 days by primary care physician or go to urgent care or return here  Activity as tolerated, no heavy physical activity for the next few days

## 2024-01-20 NOTE — ED INITIAL ASSESSMENT (HPI)
Pt was at [facility], missed a step, fell and hit head on concrete. Fall was unwitnessed. On Medic arrival pt confused . Lac above left eye. C collar placed. Trauma 2 called at 0924. Speaking in clear sentences but remains confused. Hx of heart transplant.

## 2024-06-10 DIAGNOSIS — Z94.1 HEART REPLACED BY TRANSPLANT  (CMD): Primary | ICD-10-CM

## 2024-07-08 ENCOUNTER — HOSPITAL ENCOUNTER (OUTPATIENT)
Dept: CARDIAC REHAB | Age: 64
Discharge: STILL ADMITTED OR ADMINISTRATIVE | End: 2024-07-08

## 2024-07-08 VITALS — HEIGHT: 69 IN

## 2024-07-08 DIAGNOSIS — K43.9 HERNIA OF ANTERIOR ABDOMINAL WALL: Primary | ICD-10-CM

## 2024-07-08 PROCEDURE — 93798 PHYS/QHP OP CAR RHAB W/ECG: CPT

## 2024-07-08 ASSESSMENT — PATIENT HEALTH QUESTIONNAIRE - PHQ9
SUM OF ALL RESPONSES TO PHQ QUESTIONS 1-9: 4
5. POOR APPETITE OR OVEREATING: NOT AT ALL
7. TROUBLE CONCENTRATING ON THINGS, SUCH AS READING THE NEWSPAPER OR WATCHING TELEVISION: NOT AT ALL
3. TROUBLE FALLING OR STAYING ASLEEP OR SLEEPING TOO MUCH: NOT AT ALL
6. FEELING BAD ABOUT YOURSELF - OR THAT YOU ARE A FAILURE OR HAVE LET YOURSELF OR YOUR FAMILY DOWN: NOT AT ALL
8. MOVING OR SPEAKING SO SLOWLY THAT OTHER PEOPLE COULD HAVE NOTICED. OR THE OPPOSITE, BEING SO FIGETY OR RESTLESS THAT YOU HAVE BEEN MOVING AROUND A LOT MORE THAN USUAL: NEARLY EVERY DAY
2. FEELING DOWN, DEPRESSED OR HOPELESS: NOT AT ALL
10. IF YOU CHECKED OFF ANY PROBLEMS, HOW DIFFICULT HAVE THESE PROBLEMS MADE IT FOR YOU TO DO YOUR WORK, TAKE CARE OF THINGS AT HOME, OR GET ALONG WITH OTHER PEOPLE: SOMEWHAT DIFFICULT
1. LITTLE INTEREST OR PLEASURE IN DOING THINGS: NOT AT ALL
9. THOUGHTS THAT YOU WOULD BE BETTER OFF DEAD, OR OF HURTING YOURSELF: NOT AT ALL
4. FEELING TIRED OR HAVING LITTLE ENERGY: SEVERAL DAYS

## 2024-07-08 ASSESSMENT — EJECTION FRACTION: LVEF_VALUE: 65

## 2024-07-10 ENCOUNTER — HOSPITAL ENCOUNTER (OUTPATIENT)
Dept: CARDIAC REHAB | Age: 64
Discharge: STILL ADMITTED OR ADMINISTRATIVE | End: 2024-07-10

## 2024-07-10 LAB
GLUCOSE BLDC GLUCOMTR-MCNC: 126 MG/DL (ref 70–99)
GLUCOSE BLDC GLUCOMTR-MCNC: 127 MG/DL (ref 70–99)

## 2024-07-10 PROCEDURE — 93798 PHYS/QHP OP CAR RHAB W/ECG: CPT

## 2024-07-10 PROCEDURE — 82962 GLUCOSE BLOOD TEST: CPT

## 2024-07-11 ENCOUNTER — HOSPITAL ENCOUNTER (OUTPATIENT)
Dept: CARDIAC REHAB | Age: 64
Discharge: STILL ADMITTED OR ADMINISTRATIVE | End: 2024-07-11

## 2024-07-11 LAB — GLUCOSE BLDC GLUCOMTR-MCNC: 130 MG/DL (ref 70–99)

## 2024-07-11 PROCEDURE — 82962 GLUCOSE BLOOD TEST: CPT

## 2024-07-11 PROCEDURE — 93798 PHYS/QHP OP CAR RHAB W/ECG: CPT

## 2024-07-15 ENCOUNTER — APPOINTMENT (OUTPATIENT)
Dept: CARDIAC REHAB | Age: 64
End: 2024-07-15

## 2024-07-15 PROCEDURE — 93798 PHYS/QHP OP CAR RHAB W/ECG: CPT

## 2024-07-17 ENCOUNTER — APPOINTMENT (OUTPATIENT)
Dept: CARDIAC REHAB | Age: 64
End: 2024-07-17

## 2024-07-17 LAB — GLUCOSE BLDC GLUCOMTR-MCNC: 154 MG/DL (ref 70–99)

## 2024-07-17 PROCEDURE — 93798 PHYS/QHP OP CAR RHAB W/ECG: CPT

## 2024-07-18 ENCOUNTER — APPOINTMENT (OUTPATIENT)
Dept: CARDIAC REHAB | Age: 64
End: 2024-07-18

## 2024-07-18 LAB — GLUCOSE BLDC GLUCOMTR-MCNC: 135 MG/DL (ref 70–99)

## 2024-07-18 PROCEDURE — 93798 PHYS/QHP OP CAR RHAB W/ECG: CPT

## 2024-07-22 ENCOUNTER — APPOINTMENT (OUTPATIENT)
Dept: CARDIAC REHAB | Age: 64
End: 2024-07-22

## 2024-07-22 LAB — GLUCOSE BLDC GLUCOMTR-MCNC: 134 MG/DL (ref 70–99)

## 2024-07-22 PROCEDURE — 93798 PHYS/QHP OP CAR RHAB W/ECG: CPT

## 2024-07-24 ENCOUNTER — APPOINTMENT (OUTPATIENT)
Dept: CARDIAC REHAB | Age: 64
End: 2024-07-24

## 2024-07-25 ENCOUNTER — APPOINTMENT (OUTPATIENT)
Dept: CARDIAC REHAB | Age: 64
End: 2024-07-25

## 2024-07-25 LAB — GLUCOSE BLDC GLUCOMTR-MCNC: 122 MG/DL (ref 70–99)

## 2024-07-25 PROCEDURE — 93798 PHYS/QHP OP CAR RHAB W/ECG: CPT

## 2024-07-29 ENCOUNTER — APPOINTMENT (OUTPATIENT)
Dept: CARDIAC REHAB | Age: 64
End: 2024-07-29

## 2024-07-29 PROCEDURE — 93798 PHYS/QHP OP CAR RHAB W/ECG: CPT

## 2024-07-31 ENCOUNTER — APPOINTMENT (OUTPATIENT)
Dept: CARDIAC REHAB | Age: 64
End: 2024-07-31

## 2024-07-31 VITALS — HEIGHT: 69 IN

## 2024-07-31 PROCEDURE — 93798 PHYS/QHP OP CAR RHAB W/ECG: CPT

## 2024-07-31 ASSESSMENT — PATIENT HEALTH QUESTIONNAIRE - PHQ9
10. IF YOU CHECKED OFF ANY PROBLEMS, HOW DIFFICULT HAVE THESE PROBLEMS MADE IT FOR YOU TO DO YOUR WORK, TAKE CARE OF THINGS AT HOME, OR GET ALONG WITH OTHER PEOPLE: SOMEWHAT DIFFICULT

## 2024-07-31 ASSESSMENT — EJECTION FRACTION: LVEF_VALUE: 65

## 2024-08-01 ENCOUNTER — HOSPITAL ENCOUNTER (OUTPATIENT)
Dept: CARDIAC REHAB | Age: 64
Discharge: STILL ADMITTED OR ADMINISTRATIVE | End: 2024-08-01

## 2024-08-01 PROCEDURE — 93798 PHYS/QHP OP CAR RHAB W/ECG: CPT

## 2024-08-05 ENCOUNTER — HOSPITAL ENCOUNTER (OUTPATIENT)
Dept: CARDIAC REHAB | Age: 64
Discharge: STILL ADMITTED OR ADMINISTRATIVE | End: 2024-08-05

## 2024-08-05 PROCEDURE — 93798 PHYS/QHP OP CAR RHAB W/ECG: CPT

## 2024-08-07 ENCOUNTER — HOSPITAL ENCOUNTER (OUTPATIENT)
Dept: CARDIAC REHAB | Age: 64
Discharge: STILL ADMITTED OR ADMINISTRATIVE | End: 2024-08-07

## 2024-08-07 PROCEDURE — 93798 PHYS/QHP OP CAR RHAB W/ECG: CPT

## 2024-08-08 ENCOUNTER — APPOINTMENT (OUTPATIENT)
Dept: CARDIAC REHAB | Age: 64
End: 2024-08-08

## 2024-08-12 ENCOUNTER — HOSPITAL ENCOUNTER (OUTPATIENT)
Dept: CARDIAC REHAB | Age: 64
Discharge: STILL ADMITTED OR ADMINISTRATIVE | End: 2024-08-12

## 2024-08-12 PROCEDURE — 93798 PHYS/QHP OP CAR RHAB W/ECG: CPT

## 2024-08-14 ENCOUNTER — HOSPITAL ENCOUNTER (OUTPATIENT)
Dept: CARDIAC REHAB | Age: 64
Discharge: STILL ADMITTED OR ADMINISTRATIVE | End: 2024-08-14

## 2024-08-14 PROCEDURE — 93798 PHYS/QHP OP CAR RHAB W/ECG: CPT

## 2024-08-15 ENCOUNTER — HOSPITAL ENCOUNTER (OUTPATIENT)
Dept: CARDIAC REHAB | Age: 64
Discharge: STILL ADMITTED OR ADMINISTRATIVE | End: 2024-08-15

## 2024-08-15 PROCEDURE — 93798 PHYS/QHP OP CAR RHAB W/ECG: CPT

## 2024-08-19 ENCOUNTER — HOSPITAL ENCOUNTER (OUTPATIENT)
Dept: CARDIAC REHAB | Age: 64
Discharge: STILL ADMITTED OR ADMINISTRATIVE | End: 2024-08-19

## 2024-08-19 PROCEDURE — 93798 PHYS/QHP OP CAR RHAB W/ECG: CPT

## 2024-08-20 VITALS — HEIGHT: 69 IN

## 2024-08-20 ASSESSMENT — EJECTION FRACTION: LVEF_VALUE: 65

## 2024-08-21 ENCOUNTER — HOSPITAL ENCOUNTER (OUTPATIENT)
Dept: CARDIAC REHAB | Age: 64
Discharge: STILL ADMITTED OR ADMINISTRATIVE | End: 2024-08-21

## 2024-08-21 PROCEDURE — 93798 PHYS/QHP OP CAR RHAB W/ECG: CPT

## 2024-08-22 ENCOUNTER — APPOINTMENT (OUTPATIENT)
Dept: CARDIAC REHAB | Age: 64
End: 2024-08-22

## 2024-08-22 PROCEDURE — 93798 PHYS/QHP OP CAR RHAB W/ECG: CPT

## 2024-08-26 ENCOUNTER — APPOINTMENT (OUTPATIENT)
Dept: CARDIAC REHAB | Age: 64
End: 2024-08-26

## 2024-08-26 PROCEDURE — 93798 PHYS/QHP OP CAR RHAB W/ECG: CPT

## 2024-08-26 ASSESSMENT — PATIENT HEALTH QUESTIONNAIRE - PHQ9
10. IF YOU CHECKED OFF ANY PROBLEMS, HOW DIFFICULT HAVE THESE PROBLEMS MADE IT FOR YOU TO DO YOUR WORK, TAKE CARE OF THINGS AT HOME, OR GET ALONG WITH OTHER PEOPLE: NOT DIFFICULT AT ALL
1. LITTLE INTEREST OR PLEASURE IN DOING THINGS: NOT AT ALL
2. FEELING DOWN, DEPRESSED OR HOPELESS: NOT AT ALL
3. TROUBLE FALLING OR STAYING ASLEEP OR SLEEPING TOO MUCH: NOT AT ALL
5. POOR APPETITE OR OVEREATING: NOT AT ALL
6. FEELING BAD ABOUT YOURSELF - OR THAT YOU ARE A FAILURE OR HAVE LET YOURSELF OR YOUR FAMILY DOWN: NOT AT ALL
4. FEELING TIRED OR HAVING LITTLE ENERGY: NOT AT ALL
SUM OF ALL RESPONSES TO PHQ QUESTIONS 1-9: 0
8. MOVING OR SPEAKING SO SLOWLY THAT OTHER PEOPLE COULD HAVE NOTICED. OR THE OPPOSITE, BEING SO FIGETY OR RESTLESS THAT YOU HAVE BEEN MOVING AROUND A LOT MORE THAN USUAL: NOT AT ALL
7. TROUBLE CONCENTRATING ON THINGS, SUCH AS READING THE NEWSPAPER OR WATCHING TELEVISION: NOT AT ALL
9. THOUGHTS THAT YOU WOULD BE BETTER OFF DEAD, OR OF HURTING YOURSELF: NOT AT ALL

## 2024-08-26 ASSESSMENT — EJECTION FRACTION: LVEF_VALUE: 65
